# Patient Record
Sex: FEMALE | Race: WHITE | URBAN - METROPOLITAN AREA
[De-identification: names, ages, dates, MRNs, and addresses within clinical notes are randomized per-mention and may not be internally consistent; named-entity substitution may affect disease eponyms.]

---

## 2019-10-07 ENCOUNTER — OFFICE VISIT (OUTPATIENT)
Dept: CARDIOLOGY CLINIC | Age: 59
End: 2019-10-07

## 2019-10-07 VITALS
HEART RATE: 84 BPM | DIASTOLIC BLOOD PRESSURE: 64 MMHG | BODY MASS INDEX: 28.07 KG/M2 | WEIGHT: 164.4 LBS | SYSTOLIC BLOOD PRESSURE: 112 MMHG | OXYGEN SATURATION: 96 % | HEIGHT: 64 IN

## 2019-10-07 DIAGNOSIS — E78.5 HYPERLIPIDEMIA, UNSPECIFIED HYPERLIPIDEMIA TYPE: ICD-10-CM

## 2019-10-07 DIAGNOSIS — I49.1 PREMATURE ATRIAL CONTRACTIONS: ICD-10-CM

## 2019-10-07 DIAGNOSIS — R94.31 ABNORMAL EKG: Primary | ICD-10-CM

## 2019-10-07 DIAGNOSIS — J45.20 MILD INTERMITTENT ASTHMA WITHOUT COMPLICATION: ICD-10-CM

## 2019-10-07 PROBLEM — I49.3 PVC'S (PREMATURE VENTRICULAR CONTRACTIONS): Status: ACTIVE | Noted: 2019-10-07

## 2019-10-07 RX ORDER — ALBUTEROL SULFATE 0.83 MG/ML
2.5 SOLUTION RESPIRATORY (INHALATION)
COMMUNITY

## 2019-10-07 RX ORDER — DEXAMETHASONE 4 MG/1
TABLET ORAL
Refills: 0 | COMMUNITY
Start: 2019-08-15

## 2019-10-07 RX ORDER — FLUTICASONE PROPIONATE 110 UG/1
2 AEROSOL, METERED RESPIRATORY (INHALATION)
COMMUNITY
Start: 2018-03-10

## 2019-10-07 RX ORDER — ALBUTEROL SULFATE 90 UG/1
AEROSOL, METERED RESPIRATORY (INHALATION)
Refills: 0 | COMMUNITY
Start: 2019-08-15

## 2019-10-07 RX ORDER — SIMVASTATIN 40 MG/1
TABLET, FILM COATED ORAL
Refills: 1 | COMMUNITY
Start: 2019-07-05

## 2019-10-07 NOTE — PROGRESS NOTES
[facility] [address] [place] [address], [address] [place] [address], [address] [facility]    Office [phone],CZD [phone]           Gabriel Iraheta is a 62 y.o. female presents for evaluation of abnormal ecg. Consult requested by Belgica Rosado MD  .      Assessment/Recommendations:      ICD-10-CM ICD-9-CM    1. Abnormal EKG R94.31 794.31 AMB POC EKG ROUTINE W/ 12 LEADS, INTER & REP   2. Premature atrial contractions I49.1 427.61    3. Hyperlipidemia, unspecified hyperlipidemia type E78.5 272.4    4. Mild intermittent asthma without complication Z72.23 700.50        Asymptomatic PACs. - echocardiogram to assess for structural heart disease      Primary Care Physician- Belgica Rosado MD.  [facility]    Follow-up 2 months to review echocardiogram findings. Subjective:  62 y.o. is referred for abnormal ECG. Recently seen by PCP at [facility]. She was recently seen for annual physical and was found to have irregular heart rhythm on PE.   ECG showed sinus rhythm with frequent premature atrial contractions. She recently returned from 2 week trip to [facility]. She exercises near daily since detention. She is without any chest pain, FUENTES nor any palpitation symptoms. She started on simvastatin several years ago due to borderline elevation in cholesterol levels due to paternal history of carotid artery disease, who also has had heart valve replacement (unsure of location, likely aortic by hx). Medical Hx  Asthma  microscopic hematuria  Colon polyps'  HLD      Past Surgical History:   Procedure Laterality Date    HX MYOMECTOMY  1995         Current Outpatient Medications:     albuterol (PROVENTIL VENTOLIN) 2.5 mg /3 mL (0.083 %) nebu, Take 2.5 mg by inhalation. , Disp: , Rfl:     PROAIR HFA 90 mcg/actuation inhaler, 2 PUFFS AS NEEDED EVERY 4 HRS FOR 10 DAY(S), Disp: , Rfl: 0    fluticasone (VERAMYST) 27.5 mcg/actuation nasal spray, 2 Sprays by Nasal route., Disp: , Rfl:     fluticasone propionate (FLOVENT HFA) 110 mcg/actuation inhaler, Take 2 Puffs by inhalation. , Disp: , Rfl:     simvastatin (ZOCOR) 40 mg tablet, TAKE ONE TABLET BY MOUTH EVERY EVENING, Disp: , Rfl: 1    FLOVENT  mcg/actuation inhaler, INHALE 2 PUFFS TWICE DAILY 10 DAYS, Disp: , Rfl: 0    Allergies   Allergen Reactions    Omnicef [Cefdinir] Hives    Penicillins Hives        Family History   Problem Relation Age of Onset    Lung Cancer Mother     Celiac Disease Mother     Diabetes Father     Arthritis-rheumatoid Father     Heart Surgery Father     Melanoma Brother    Father- carotid stenosis, heart valve replacement  Mother- lung cancer      Social History     Tobacco Use    Smoking status: Former Smoker    Smokeless tobacco: Never Used   Substance Use Topics    Alcohol use: Yes     Alcohol/week: 1.0 standard drinks     Types: 1 Glasses of wine per week    Drug use: Not on file       Review of Symptoms:  Pertinent Positive:negative  Pertinent Negative:No chest pain, dyspnea on exertion, shortness of breath, orthopnea, PND    All Other systems reviewed and are negative for a Comprehensive ROS (10+)    Physical Exam    Blood pressure 112/64, pulse 84, height 5' 4\" (1.626 m), weight 164 lb 6.4 oz (74.6 kg), SpO2 96 %. Constitutional:  well-developed and well-nourished. No distress. HENT: Normocephalic. Eyes: No scleral icterus. Neck:  Neck supple. No JVD present. Pulmonary/Chest: Effort normal and breath sounds normal. No respiratory distress, wheezes or rales. Cardiovascular: Normal rate, regular rhythm, S1 S2 . Exam reveals no gallop and no friction rub. No murmur heard. No edema. Extremities:  Normal muscle tone  Abdominal:   No abnormal distension. Neurological:  Moving all extremities, cranial nerves appear grossly intact. Skin: Skin is not cold. Not diaphoretic. No erythema. Psychiatric:  Grossly normal mood and affect.   Intact insight. Objective Data:    ECG: personally reviewed and  intrepreted  9/10/2019- ecg from PCP.   Sinus rhythm with frequent PAC  10/7/2019- NSR, low voltage limb limbs    Lipids 8/19/19 - , HDL 58, LDL 83, TG 77, VLDL 15     CAC 2015- DO Aaron

## 2019-10-07 NOTE — PROGRESS NOTES
Delisa Iraheta is a 62 y.o. female    Chief Complaint   Patient presents with    New Patient     abn EKG, PVC       Chest pain  No    SOB No    Dizziness No    Swelling No    Refills No    Visit Vitals  /64 (BP 1 Location: Left arm)   Pulse 84   Ht 5' 4\" (1.626 m)   Wt 164 lb 6.4 oz (74.6 kg)   SpO2 96%   BMI 28.22 kg/m²       1. Have you been to the ER, urgent care clinic since your last visit? Hospitalized since your last visit? No    2. Have you seen or consulted any other health care providers outside of the [address] [place] [address] since your last visit? Include any pap smears or colon screening.  No

## 2019-10-07 NOTE — LETTER
10/7/19 Patient: Olvin Valentin YOB: 1960 Date of Visit: 10/7/2019 Mickey Johnson MD 
[address] [id number] VIA Facsimile: [phone] Dear Mickey Johnson MD, Thank you for referring Ms. Eloisa Iraheta to [facility] for evaluation. My notes for this consultation are attached. If you have questions, please do not hesitate to call me. I look forward to following your patient along with you.  
 
 
Sincerely, 
 
Jake Hunt, DO

## 2019-12-03 ENCOUNTER — OFFICE VISIT (OUTPATIENT)
Dept: CARDIOLOGY CLINIC | Age: 59
End: 2019-12-03

## 2019-12-03 VITALS
RESPIRATION RATE: 18 BRPM | DIASTOLIC BLOOD PRESSURE: 78 MMHG | BODY MASS INDEX: 27.66 KG/M2 | OXYGEN SATURATION: 98 % | HEIGHT: 64 IN | SYSTOLIC BLOOD PRESSURE: 114 MMHG | HEART RATE: 80 BPM | WEIGHT: 162 LBS

## 2019-12-03 DIAGNOSIS — R94.31 ABNORMAL EKG: ICD-10-CM

## 2019-12-03 DIAGNOSIS — E78.5 HYPERLIPIDEMIA, UNSPECIFIED HYPERLIPIDEMIA TYPE: ICD-10-CM

## 2019-12-03 DIAGNOSIS — I49.1 PREMATURE ATRIAL CONTRACTIONS: Primary | ICD-10-CM

## 2019-12-03 DIAGNOSIS — J45.20 MILD INTERMITTENT ASTHMA WITHOUT COMPLICATION: ICD-10-CM

## 2019-12-03 NOTE — PROGRESS NOTES
[facility] [address] [place] [address], [address] [place] [address], [address] [facility]    Office [phone],OUR [phone]           Ana Iraheta is a 62 y.o. female presents for f/up evaluation of abnormal ecg. Assessment/Recommendations:      ICD-10-CM ICD-9-CM    1. Premature atrial contractions I49.1 427.61    2. Abnormal EKG R94.31 794.31    3. Hyperlipidemia, unspecified hyperlipidemia type E78.5 272.4    4. Mild intermittent asthma without complication B53.95 701.18        Asymptomatic PACs  - echocardiogram without any significant structurally abnormality, normal lv function. Will hold off on av marcia agents at this time. Primary Care Physician- Alice Freitas DO.  [facility]    F/up one year      Subjective:  62 y.o. Presents for f/up evaluation. Echocardiogram with normal lv function. Continues w/o any palpitations. No chest pain, sob, orthopnea, pnd.      Medical Hx  Asthma  microscopic hematuria  Colon polyps   HLD      Past Surgical History:   Procedure Laterality Date    HX MYOMECTOMY  1995         Current Outpatient Medications:     albuterol (PROVENTIL VENTOLIN) 2.5 mg /3 mL (0.083 %) nebu, Take 2.5 mg by inhalation. , Disp: , Rfl:     PROAIR HFA 90 mcg/actuation inhaler, 2 PUFFS AS NEEDED EVERY 4 HRS FOR 10 DAY(S), Disp: , Rfl: 0    fluticasone (VERAMYST) 27.5 mcg/actuation nasal spray, 2 Sprays by Nasal route., Disp: , Rfl:     fluticasone propionate (FLOVENT HFA) 110 mcg/actuation inhaler, Take 2 Puffs by inhalation. , Disp: , Rfl:     simvastatin (ZOCOR) 40 mg tablet, TAKE ONE TABLET BY MOUTH EVERY EVENING, Disp: , Rfl: 1    FLOVENT  mcg/actuation inhaler, INHALE 2 PUFFS TWICE DAILY 10 DAYS, Disp: , Rfl: 0    Allergies   Allergen Reactions    Omnicef [Cefdinir] Hives    Penicillins Hives        Family History   Problem Relation Age of Onset    Lung Cancer Mother     Celiac Disease Mother     Diabetes Father  Arthritis-rheumatoid Father     Heart Surgery Father     Melanoma Brother    Father- carotid stenosis, heart valve replacement  Mother- lung cancer      Social History     Tobacco Use    Smoking status: Former Smoker    Smokeless tobacco: Never Used   Substance Use Topics    Alcohol use: Yes     Alcohol/week: 1.0 standard drinks     Types: 1 Glasses of wine per week    Drug use: Not on file       Review of Symptoms:  Pertinent Positive:negative  Pertinent Negative:No chest pain, dyspnea on exertion, shortness of breath, orthopnea, PND    All Other systems reviewed and are negative for a Comprehensive ROS (10+)    Physical Exam    Blood pressure 114/78, pulse 80, resp. rate 18, height 5' 4\" (1.626 m), weight 162 lb (73.5 kg), SpO2 98 %. Constitutional:  well-developed and well-nourished. No distress. HENT: Normocephalic. Eyes: No scleral icterus. Neck:  Neck supple. No JVD present. Pulmonary/Chest: Effort normal and breath sounds normal. No respiratory distress, wheezes or rales. Cardiovascular: Normal rate, regular rhythm, S1 S2 . Exam reveals no gallop and no friction rub. No murmur heard. No edema. Extremities:  Normal muscle tone  Abdominal:   No abnormal distension. Neurological:  Moving all extremities, cranial nerves appear grossly intact. Skin: Skin is not cold. Not diaphoretic. No erythema. Psychiatric:  Grossly normal mood and affect. Intact insight. Objective Data:    ECG: personally reviewed and  intrepreted  9/10/2019- ecg from PCP. Sinus rhythm with frequent PAC  10/7/2019- NSR, low voltage limb limbs    Lipids 8/19/19 - , HDL 58, LDL 83, TG 77, VLDL 15     CAC 2015- 0      10/14/19   ECHO ADULT COMPLETE 10/15/2019 10/15/2019    Narrative · Left Ventricle: Normal cavity size, wall thickness and systolic function   (ejection fraction normal). Calculated left ventricular ejection fraction   is 55%. Visually measured ejection fraction.      Frequent PACs throughout exam       Signed by: Efrain Jean-Baptiste, DO Miracle Doss, DO

## 2019-12-03 NOTE — PROGRESS NOTES
Room #8  Visit Vitals  /78 (BP 1 Location: Left arm, BP Patient Position: Sitting)   Pulse 80   Resp 18   Ht 5' 4\" (1.626 m)   Wt 162 lb (73.5 kg)   SpO2 98%   BMI 27.81 kg/m²     No Cardiac Complaints